# Patient Record
Sex: FEMALE | Race: WHITE | NOT HISPANIC OR LATINO
[De-identification: names, ages, dates, MRNs, and addresses within clinical notes are randomized per-mention and may not be internally consistent; named-entity substitution may affect disease eponyms.]

---

## 2024-07-16 ENCOUNTER — NON-APPOINTMENT (OUTPATIENT)
Age: 39
End: 2024-07-16

## 2024-07-18 ENCOUNTER — ASOB RESULT (OUTPATIENT)
Age: 39
End: 2024-07-18

## 2024-07-18 ENCOUNTER — APPOINTMENT (OUTPATIENT)
Dept: ANTEPARTUM | Facility: CLINIC | Age: 39
End: 2024-07-18
Payer: COMMERCIAL

## 2024-07-18 PROCEDURE — 76813 OB US NUCHAL MEAS 1 GEST: CPT

## 2024-07-18 PROCEDURE — 93976 VASCULAR STUDY: CPT

## 2024-09-05 ENCOUNTER — ASOB RESULT (OUTPATIENT)
Age: 39
End: 2024-09-05

## 2024-09-05 ENCOUNTER — APPOINTMENT (OUTPATIENT)
Dept: ANTEPARTUM | Facility: CLINIC | Age: 39
End: 2024-09-05
Payer: COMMERCIAL

## 2024-09-05 PROCEDURE — 76811 OB US DETAILED SNGL FETUS: CPT

## 2024-09-05 PROCEDURE — 76817 TRANSVAGINAL US OBSTETRIC: CPT

## 2024-11-04 ENCOUNTER — TRANSCRIPTION ENCOUNTER (OUTPATIENT)
Age: 39
End: 2024-11-04

## 2024-11-05 ENCOUNTER — ASOB RESULT (OUTPATIENT)
Age: 39
End: 2024-11-05

## 2024-11-05 ENCOUNTER — APPOINTMENT (OUTPATIENT)
Dept: ANTEPARTUM | Facility: CLINIC | Age: 39
End: 2024-11-05
Payer: COMMERCIAL

## 2024-11-05 PROCEDURE — 76816 OB US FOLLOW-UP PER FETUS: CPT

## 2024-11-05 PROCEDURE — 76819 FETAL BIOPHYS PROFIL W/O NST: CPT | Mod: 59

## 2024-11-05 PROCEDURE — 76820 UMBILICAL ARTERY ECHO: CPT | Mod: 59

## 2024-12-02 ENCOUNTER — ASOB RESULT (OUTPATIENT)
Age: 39
End: 2024-12-02

## 2024-12-02 ENCOUNTER — APPOINTMENT (OUTPATIENT)
Dept: ANTEPARTUM | Facility: CLINIC | Age: 39
End: 2024-12-02
Payer: COMMERCIAL

## 2024-12-02 PROCEDURE — 76816 OB US FOLLOW-UP PER FETUS: CPT

## 2024-12-02 PROCEDURE — 76820 UMBILICAL ARTERY ECHO: CPT | Mod: 59

## 2024-12-02 PROCEDURE — 76819 FETAL BIOPHYS PROFIL W/O NST: CPT | Mod: 59

## 2024-12-23 ENCOUNTER — APPOINTMENT (OUTPATIENT)
Dept: ANTEPARTUM | Facility: CLINIC | Age: 39
End: 2024-12-23
Payer: COMMERCIAL

## 2024-12-23 ENCOUNTER — ASOB RESULT (OUTPATIENT)
Age: 39
End: 2024-12-23

## 2024-12-23 PROCEDURE — 76819 FETAL BIOPHYS PROFIL W/O NST: CPT

## 2024-12-23 PROCEDURE — 76816 OB US FOLLOW-UP PER FETUS: CPT

## 2024-12-23 PROCEDURE — 76820 UMBILICAL ARTERY ECHO: CPT | Mod: 59

## 2025-01-06 ENCOUNTER — APPOINTMENT (OUTPATIENT)
Dept: ANTEPARTUM | Facility: CLINIC | Age: 40
End: 2025-01-06
Payer: COMMERCIAL

## 2025-01-06 ENCOUNTER — ASOB RESULT (OUTPATIENT)
Age: 40
End: 2025-01-06

## 2025-01-06 PROCEDURE — 76819 FETAL BIOPHYS PROFIL W/O NST: CPT

## 2025-01-06 PROCEDURE — 76820 UMBILICAL ARTERY ECHO: CPT | Mod: 59

## 2025-01-06 PROCEDURE — 76817 TRANSVAGINAL US OBSTETRIC: CPT

## 2025-01-06 PROCEDURE — 76816 OB US FOLLOW-UP PER FETUS: CPT

## 2025-01-14 PROBLEM — Z00.00 ENCOUNTER FOR PREVENTIVE HEALTH EXAMINATION: Status: ACTIVE | Noted: 2025-01-14

## 2025-01-16 ENCOUNTER — INPATIENT (INPATIENT)
Facility: HOSPITAL | Age: 40
LOS: 3 days | Discharge: HOME | End: 2025-01-20
Attending: STUDENT IN AN ORGANIZED HEALTH CARE EDUCATION/TRAINING PROGRAM | Admitting: STUDENT IN AN ORGANIZED HEALTH CARE EDUCATION/TRAINING PROGRAM
Payer: COMMERCIAL

## 2025-01-16 VITALS
SYSTOLIC BLOOD PRESSURE: 130 MMHG | RESPIRATION RATE: 18 BRPM | DIASTOLIC BLOOD PRESSURE: 80 MMHG | TEMPERATURE: 98 F | OXYGEN SATURATION: 100 % | HEART RATE: 95 BPM

## 2025-01-16 DIAGNOSIS — Z98.890 OTHER SPECIFIED POSTPROCEDURAL STATES: Chronic | ICD-10-CM

## 2025-01-16 LAB
BASOPHILS # BLD AUTO: 0.02 K/UL — SIGNIFICANT CHANGE UP (ref 0–0.2)
BASOPHILS NFR BLD AUTO: 0.2 % — SIGNIFICANT CHANGE UP (ref 0–2)
BLD GP AB SCN SERPL QL: NEGATIVE — SIGNIFICANT CHANGE UP
EOSINOPHIL # BLD AUTO: 0.05 K/UL — SIGNIFICANT CHANGE UP (ref 0–0.5)
EOSINOPHIL NFR BLD AUTO: 0.5 % — SIGNIFICANT CHANGE UP (ref 0–6)
HCT VFR BLD CALC: 31.8 % — LOW (ref 34.5–45)
HGB BLD-MCNC: 10.2 G/DL — LOW (ref 11.5–15.5)
IMM GRANULOCYTES NFR BLD AUTO: 0.9 % — SIGNIFICANT CHANGE UP (ref 0–0.9)
LYMPHOCYTES # BLD AUTO: 19.6 % — SIGNIFICANT CHANGE UP (ref 13–44)
LYMPHOCYTES # BLD AUTO: 2.1 K/UL — SIGNIFICANT CHANGE UP (ref 1–3.3)
MCHC RBC-ENTMCNC: 27.1 PG — SIGNIFICANT CHANGE UP (ref 27–34)
MCHC RBC-ENTMCNC: 32.1 G/DL — SIGNIFICANT CHANGE UP (ref 32–36)
MCV RBC AUTO: 84.4 FL — SIGNIFICANT CHANGE UP (ref 80–100)
MONOCYTES # BLD AUTO: 0.85 K/UL — SIGNIFICANT CHANGE UP (ref 0–0.9)
MONOCYTES NFR BLD AUTO: 7.9 % — SIGNIFICANT CHANGE UP (ref 2–14)
NEUTROPHILS # BLD AUTO: 7.62 K/UL — HIGH (ref 1.8–7.4)
NEUTROPHILS NFR BLD AUTO: 70.9 % — SIGNIFICANT CHANGE UP (ref 43–77)
NRBC # BLD: 0 /100 WBCS — SIGNIFICANT CHANGE UP (ref 0–0)
NRBC BLD-RTO: 0 /100 WBCS — SIGNIFICANT CHANGE UP (ref 0–0)
PLATELET # BLD AUTO: 256 K/UL — SIGNIFICANT CHANGE UP (ref 150–400)
RBC # BLD: 3.77 M/UL — LOW (ref 3.8–5.2)
RBC # FLD: 14.1 % — SIGNIFICANT CHANGE UP (ref 10.3–14.5)
RH IG SCN BLD-IMP: POSITIVE — SIGNIFICANT CHANGE UP
RH IG SCN BLD-IMP: POSITIVE — SIGNIFICANT CHANGE UP
T PALLIDUM AB TITR SER: NEGATIVE — SIGNIFICANT CHANGE UP
WBC # BLD: 10.74 K/UL — HIGH (ref 3.8–10.5)
WBC # FLD AUTO: 10.74 K/UL — HIGH (ref 3.8–10.5)

## 2025-01-16 RX ORDER — DEXAMETHASONE SODIUM PHOSPHATE 4 MG/ML
4 INJECTION, SOLUTION INTRA-ARTICULAR; INTRALESIONAL; INTRAMUSCULAR; INTRAVENOUS; SOFT TISSUE EVERY 6 HOURS
Refills: 0 | Status: DISCONTINUED | OUTPATIENT
Start: 2025-01-16 | End: 2025-01-17

## 2025-01-16 RX ORDER — OXYTOCIN/0.9 % SODIUM CHLORIDE 10/500ML
167 PLASTIC BAG, INJECTION (ML) INTRAVENOUS
Qty: 30 | Refills: 0 | Status: DISCONTINUED | OUTPATIENT
Start: 2025-01-16 | End: 2025-01-17

## 2025-01-16 RX ORDER — ONDANSETRON 4 MG/1
4 TABLET, ORALLY DISINTEGRATING ORAL ONCE
Refills: 0 | Status: COMPLETED | OUTPATIENT
Start: 2025-01-16 | End: 2025-01-16

## 2025-01-16 RX ORDER — ONDANSETRON 4 MG/1
4 TABLET, ORALLY DISINTEGRATING ORAL EVERY 6 HOURS
Refills: 0 | Status: DISCONTINUED | OUTPATIENT
Start: 2025-01-16 | End: 2025-01-17

## 2025-01-16 RX ORDER — SODIUM CHLORIDE 9 G/ML
1000 INJECTION, SOLUTION INTRAVENOUS
Refills: 0 | Status: DISCONTINUED | OUTPATIENT
Start: 2025-01-16 | End: 2025-01-17

## 2025-01-16 RX ORDER — FENTANYL/BUPIVACAINE/NS/PF 2MCG/ML-.1
250 PLASTIC BAG, INJECTION (ML) INJECTION
Refills: 0 | Status: DISCONTINUED | OUTPATIENT
Start: 2025-01-16 | End: 2025-01-17

## 2025-01-16 RX ORDER — LEVOTHYROXINE SODIUM 25 UG/1
150 TABLET ORAL DAILY
Refills: 0 | Status: DISCONTINUED | OUTPATIENT
Start: 2025-01-16 | End: 2025-01-17

## 2025-01-16 RX ORDER — FAMOTIDINE 10 MG/ML
20 INJECTION INTRAVENOUS ONCE
Refills: 0 | Status: COMPLETED | OUTPATIENT
Start: 2025-01-16 | End: 2025-01-16

## 2025-01-16 RX ORDER — OXYTOCIN/0.9 % SODIUM CHLORIDE 10/500ML
2 PLASTIC BAG, INJECTION (ML) INTRAVENOUS
Qty: 30 | Refills: 0 | Status: DISCONTINUED | OUTPATIENT
Start: 2025-01-16 | End: 2025-01-17

## 2025-01-16 RX ORDER — ANTISEPTIC SURGICAL SCRUB 0.04 MG/ML
1 SOLUTION TOPICAL DAILY
Refills: 0 | Status: DISCONTINUED | OUTPATIENT
Start: 2025-01-16 | End: 2025-01-17

## 2025-01-16 RX ORDER — SODIUM CITRATE AND CITRIC ACID MONOHYDRATE 1002; 1500 MG/15ML; MG/15ML
15 SOLUTION ORAL EVERY 6 HOURS
Refills: 0 | Status: DISCONTINUED | OUTPATIENT
Start: 2025-01-16 | End: 2025-01-17

## 2025-01-16 RX ORDER — NALOXONE HYDROCHLORIDE 3 MG/.1ML
0.1 SPRAY NASAL
Refills: 0 | Status: DISCONTINUED | OUTPATIENT
Start: 2025-01-16 | End: 2025-01-17

## 2025-01-16 RX ADMIN — ONDANSETRON 4 MILLIGRAM(S): 4 TABLET, ORALLY DISINTEGRATING ORAL at 13:50

## 2025-01-16 RX ADMIN — Medication 2 MILLIUNIT(S)/MIN: at 08:00

## 2025-01-16 RX ADMIN — FAMOTIDINE 20 MILLIGRAM(S): 10 INJECTION INTRAVENOUS at 19:40

## 2025-01-16 RX ADMIN — SODIUM CHLORIDE 125 MILLILITER(S): 9 INJECTION, SOLUTION INTRAVENOUS at 04:45

## 2025-01-16 RX ADMIN — SODIUM CHLORIDE 125 MILLILITER(S): 9 INJECTION, SOLUTION INTRAVENOUS at 14:00

## 2025-01-16 NOTE — OB PROVIDER LABOR PROGRESS NOTE - NS_OBIHICONTRACTIONPATTERNDETAILS_OBGYN_ALL_OB_FT
2-3 contractions in 10 minutes
q 2 min
q 3
Contractions q2-3min
ctx q 3-4 min
ctx q2-4 minutes
q2
contractions q3-6min
q 3
3 contractions in 10 minutes
Ctx irregularly on 16mU of pitocin

## 2025-01-16 NOTE — OB PROVIDER H&P - HISTORY OF PRESENT ILLNESS
39y  @ 39w0d who presents for rule out rupture of membranes. Patient states that at 0100, she felt a "pop" and then experienced a large gush of blood tinged fluid. Since this time, the patient endorses that she continues to leak. Additionally, patient states that she began experiencing contractions around 2130 on 1/15. Patient states that since this time, especially after SROM, her contractions have become more intense and frequent. She reports that her contractions are now 3-4min apart. Patient endorses fetal movement. Denies vaginal bleeding.    Ante: spontaneous conception, nipt low risk, anatomy sono wnl, gct passed, gbs neg, EFW 4000g by leopolds. Last BH scan on 25 EFW 3748g (94%ile), AC 99%ile. Uncomplicated pregnancy. s/p ASA 81mg for PEC ppx    OBhx:   G1- current  GYNhx: Denies any current uterine fibroids, ovarian cysts, abnormal paps, or STIs/herpes  Shx: , hysteroscopic fibroidectomy. 2018 total thyroidectomy  Medhx: Hx of thyroid cancer s/p thyroidectomy. Hypothyroidism. Denies hx of asthma, thyroid disorders, or hypertensive disorders  Meds: PNV, Synthroid 150mcg, Zofran, vitamin D  Allergies: No Known Allergies        PE:  HR: 95  BP: 130/80  RR: 18  SpO2: 100  General: AOx3. No acute distress, breathing through contractions.  Respiratory: No increased work of breathing, no signs of respiratory distress  Abdominal: Gravid, soft, nontender  Extremities: Atraumatic in appearance. No erythema, no unilateral swelling   SVE: 5/80/-2, forebag ruptured LM upon exam   SSE: nitrazine equivocal    NST: 130bpm, moderate variability, +accels, -decels  TOCO: Ctx q3-4 min  TAUS: Cephalic presentation, posterior placenta (see image attached)     39y  @ 39w0d who presents for rule out rupture of membranes. Patient states that at 0100, she felt a "pop" and then experienced a large gush of blood tinged fluid. Since this time, the patient endorses that she continues to leak. Additionally, patient states that she began experiencing contractions around 2130 on 1/15. Patient states that since this time, especially after SROM, her contractions have become more intense and frequent. She reports that her contractions are now 3-4min apart. Patient endorses fetal movement. Denies vaginal bleeding.    Ante: spontaneous conception, nipt low risk, anatomy sono wnl, gct passed, gbs neg, EFW 4000g by leopolds. Last BH scan on 25 EFW 3748g (94%ile), AC 99%ile. Uncomplicated pregnancy. s/p ASA 81mg for PEC ppx    OBhx:   G1- current  GYNhx: Endorses remote hx of abnormal pap. Denies any current uterine fibroids, ovarian cysts, abnormal paps, or STIs/herpes  Shx: , hysteroscopic fibroidectomy. 2018 total thyroidectomy  Medhx: Hx of thyroid cancer s/p thyroidectomy. Hypothyroidism. Denies hx of asthma, thyroid disorders, or hypertensive disorders  Meds: PNV, Synthroid 150mcg, Zofran, vitamin D  Allergies: No Known Allergies        PE:  HR: 95  BP: 130/80  RR: 18  SpO2: 100  General: AOx3. No acute distress, breathing through contractions.  Respiratory: No increased work of breathing, no signs of respiratory distress  Abdominal: Gravid, soft, nontender  Extremities: Atraumatic in appearance. No erythema, no unilateral swelling   SVE: 5/80/-2, forebag ruptured LM upon exam   SSE: nitrazine equivocal    NST: 130bpm, moderate variability, +accels, -decels  TOCO: Ctx q3-4 min  TAUS: Cephalic presentation, posterior placenta (see image attached)

## 2025-01-16 NOTE — OB PROVIDER H&P - NSLOWPPHRISK_OBGYN_A_OB
No previous uterine incision/[place] Pregnancy/Less than or equal to 4 previous vaginal births/No known bleeding disorder/No history of postpartum hemorrhage/No other PPH risks indicated

## 2025-01-16 NOTE — PRE-ANESTHESIA EVALUATION ADULT - NSANTHOSAYNRD_GEN_A_CORE
No. LIYAH screening performed.  STOP BANG Legend: 0-2 = LOW Risk; 3-4 = INTERMEDIATE Risk; 5-8 = HIGH Risk

## 2025-01-16 NOTE — OB PROVIDER LABOR PROGRESS NOTE - ASSESSMENT
Plan to start pitocin. 
Tracing overall reassuring, tracing improved with repositioning  Will continue to monitor
-s/p SROM BT --> LM  -Epidural in situ  -Continue to monitor and titrate pitocin as tolerated    Stephania Haas PA-C
dw patient possible arrest  recommend an IUPC but patient wants to wait before further intervention  dw pt re possible cpd
Para 0 at 39 weeks in labor  Patient comfortable with epidural  augmentation of labor with pitocin  continuous fetal monitoring
- Continue current management    Dr. Mustafa in house.
38 yo  @ 39w0d in early labor  plan for epidural  will re-examine after epidural
Para 0 at 39 wks 1 day  Patient repositioned  continue to augment labor with pitocin  suspected LGA- shoulder precautions
Pt presenting in early labor, suspected macrosomia  tracing overall reassuring  discussed pitocin if ctx spaced  ve unchanged

## 2025-01-16 NOTE — OB RN PATIENT PROFILE - NS PRO RUBELLA RECEIVED Y/N
Patient's Nurse  with [facility] needs a copy of the patient's current medication list     Pls fax to:  Kalli [facility] Nurse Case Chris  FAX: [phone]  [facility]: [phone]
Printed and faxed 
no...

## 2025-01-16 NOTE — OB PROVIDER LABOR PROGRESS NOTE - NS_OBIHIFHRDETAILS_OBGYN_ALL_OB_FT
120 bpm, moderate variability, - accels, + late decel
Cat II: 145bpm, moderate variability, +accels, +variable decels
FHT Category I with moderate variability and accelerations, no decelerations.  Period of non-recurrent late decelerations earlier that resolved with maternal position change.
fhr reassuring  good ltv, negative decels
fhr reassuring with good ltv  neg decels
FHR 120s with moderate variability, positive accelerations, no decelerations  Category I tracing
baseline , mod variability, +accels, -decels, cat I
fhr reassuring. occasional type 1 decels
130bpm, moderate variability, + accels, - decels
FHR 140s with moderate variability, positive accelerations, intermittent variable decelerations  Category II tracing
baseline 150, mod schuyler, -accels, +2 decelerations; Cat II tracing

## 2025-01-16 NOTE — OB PROVIDER H&P - ASSESSMENT
39y  @ 39w0d who is admitted for SROM/early labor  - Admit to L&D  - NPO/IV Hydration  - Continuous EFM and toco  - Routine labs ordred  - Prenatal labs reviewed, GBS (-)  - Plan for expectant management   - Consents obtained for vaginal delivery and all indicated procedures    Discussed with Dr. Estevan Haas PA-C 39y  @ 39w0d who is admitted for SROM/early labor  - Admit to L&D  - NPO/IV Hydration  - Continuous EFM and toco  - Routine labs ordred  - Prenatal labs reviewed, GBS (-)  - Shoulder precautions. r/b discussed  - Plan for expectant management   - Consents obtained for vaginal delivery and all indicated procedures    Discussed with Dr. Estevan Haas PA-C

## 2025-01-16 NOTE — OB PROVIDER H&P - NSOBVTERISKASSESS_OBGYN_ALL_OB_FT
OBAntePartum Assessment Completed on: 16-Jan-2025 03:19 OBAntePartum Assessment Completed on: 16-Jan-2025 03:26

## 2025-01-17 ENCOUNTER — TRANSCRIPTION ENCOUNTER (OUTPATIENT)
Age: 40
End: 2025-01-17

## 2025-01-17 PROCEDURE — 88307 TISSUE EXAM BY PATHOLOGIST: CPT | Mod: 26

## 2025-01-17 RX ORDER — ACETAMINOPHEN 160 MG/5ML
975 SUSPENSION ORAL
Refills: 0 | Status: DISCONTINUED | OUTPATIENT
Start: 2025-01-17 | End: 2025-01-20

## 2025-01-17 RX ORDER — OXYCODONE HYDROCHLORIDE 30 MG/1
5 TABLET ORAL ONCE
Refills: 0 | Status: DISCONTINUED | OUTPATIENT
Start: 2025-01-17 | End: 2025-01-20

## 2025-01-17 RX ORDER — DIPHENOXYLATE HYDROCHLORIDE AND ATROPINE SULFATE 2.5; .025 MG/1; MG/1
1 TABLET ORAL ONCE
Refills: 0 | Status: DISCONTINUED | OUTPATIENT
Start: 2025-01-17 | End: 2025-01-17

## 2025-01-17 RX ORDER — METHYLERGONOVINE MALEATE 0.2 MG/1
0.2 TABLET, COATED ORAL EVERY 4 HOURS
Refills: 0 | Status: COMPLETED | OUTPATIENT
Start: 2025-01-17 | End: 2025-01-17

## 2025-01-17 RX ORDER — SODIUM CHLORIDE 9 G/ML
1000 INJECTION, SOLUTION INTRAVENOUS
Refills: 0 | Status: DISCONTINUED | OUTPATIENT
Start: 2025-01-17 | End: 2025-01-20

## 2025-01-17 RX ORDER — MAGNESIUM HYDROXIDE 400 MG/5ML
30 SUSPENSION, ORAL (FINAL DOSE FORM) ORAL
Refills: 0 | Status: DISCONTINUED | OUTPATIENT
Start: 2025-01-17 | End: 2025-01-20

## 2025-01-17 RX ORDER — METOCLOPRAMIDE 10 MG/1
10 TABLET ORAL ONCE
Refills: 0 | Status: COMPLETED | OUTPATIENT
Start: 2025-01-17 | End: 2025-01-17

## 2025-01-17 RX ORDER — DIPHENHYDRAMINE HCL 25 MG
25 CAPSULE ORAL EVERY 6 HOURS
Refills: 0 | Status: DISCONTINUED | OUTPATIENT
Start: 2025-01-17 | End: 2025-01-20

## 2025-01-17 RX ORDER — SODIUM CITRATE AND CITRIC ACID MONOHYDRATE 1002; 1500 MG/15ML; MG/15ML
30 SOLUTION ORAL ONCE
Refills: 0 | Status: COMPLETED | OUTPATIENT
Start: 2025-01-17 | End: 2025-01-17

## 2025-01-17 RX ORDER — ONDANSETRON 4 MG/1
8 TABLET, ORALLY DISINTEGRATING ORAL ONCE
Refills: 0 | Status: COMPLETED | OUTPATIENT
Start: 2025-01-17 | End: 2025-01-17

## 2025-01-17 RX ORDER — OXYTOCIN/0.9 % SODIUM CHLORIDE 10/500ML
42 PLASTIC BAG, INJECTION (ML) INTRAVENOUS
Qty: 30 | Refills: 0 | Status: DISCONTINUED | OUTPATIENT
Start: 2025-01-17 | End: 2025-01-20

## 2025-01-17 RX ORDER — FAMOTIDINE 10 MG/ML
20 INJECTION INTRAVENOUS ONCE
Refills: 0 | Status: COMPLETED | OUTPATIENT
Start: 2025-01-17 | End: 2025-01-17

## 2025-01-17 RX ORDER — ENOXAPARIN SODIUM 100 MG/ML
40 INJECTION SUBCUTANEOUS EVERY 24 HOURS
Refills: 0 | Status: DISCONTINUED | OUTPATIENT
Start: 2025-01-17 | End: 2025-01-20

## 2025-01-17 RX ORDER — SCOPOLAMINE 1 MG/3D
1 PATCH, EXTENDED RELEASE TRANSDERMAL ONCE
Refills: 0 | Status: COMPLETED | OUTPATIENT
Start: 2025-01-17 | End: 2025-01-17

## 2025-01-17 RX ORDER — CEFAZOLIN SODIUM IN 0.9 % NACL 2 G/10 ML
2000 SYRINGE (ML) INTRAVENOUS ONCE
Refills: 0 | Status: COMPLETED | OUTPATIENT
Start: 2025-01-17 | End: 2025-01-17

## 2025-01-17 RX ORDER — AZITHROMYCIN DIHYDRATE 500 MG/1
500 TABLET, FILM COATED ORAL ONCE
Refills: 0 | Status: COMPLETED | OUTPATIENT
Start: 2025-01-17 | End: 2025-01-17

## 2025-01-17 RX ORDER — OXYCODONE HYDROCHLORIDE 30 MG/1
5 TABLET ORAL
Refills: 0 | Status: DISCONTINUED | OUTPATIENT
Start: 2025-01-17 | End: 2025-01-20

## 2025-01-17 RX ORDER — IBUPROFEN 600 MG/1
600 TABLET, FILM COATED ORAL EVERY 6 HOURS
Refills: 0 | Status: COMPLETED | OUTPATIENT
Start: 2025-01-17 | End: 2025-12-16

## 2025-01-17 RX ORDER — CLOSTRIDIUM TETANI TOXOID ANTIGEN (FORMALDEHYDE INACTIVATED), CORYNEBACTERIUM DIPHTHERIAE TOXOID ANTIGEN (FORMALDEHYDE INACTIVATED), BORDETELLA PERTUSSIS TOXOID ANTIGEN (GLUTARALDEHYDE INACTIVATED), BORDETELLA PERTUSSIS FILAMENTOUS HEMAGGLUTININ ANTIGEN (FORMALDEHYDE INACTIVATED), BORDETELLA PERTUSSIS PERTACTIN ANTIGEN, AND BORDETELLA PERTUSSIS FIMBRIAE 2/3 ANTIGEN 5; 2; 2.5; 5; 3; 5 [LF]/.5ML; [LF]/.5ML; UG/.5ML; UG/.5ML; UG/.5ML; UG/.5ML
0.5 INJECTION, SUSPENSION INTRAMUSCULAR ONCE
Refills: 0 | Status: COMPLETED | OUTPATIENT
Start: 2025-01-17

## 2025-01-17 RX ORDER — KETOROLAC TROMETHAMINE 10 MG
30 TABLET ORAL EVERY 6 HOURS
Refills: 0 | Status: DISCONTINUED | OUTPATIENT
Start: 2025-01-17 | End: 2025-01-17

## 2025-01-17 RX ORDER — LOPERAMIDE HYDROCHLORIDE 2 MG/1
2 CAPSULE ORAL ONCE
Refills: 0 | Status: COMPLETED | OUTPATIENT
Start: 2025-01-17 | End: 2025-01-17

## 2025-01-17 RX ORDER — ANTISEPTIC SURGICAL SCRUB 0.04 MG/ML
1 SOLUTION TOPICAL DAILY
Refills: 0 | Status: DISCONTINUED | OUTPATIENT
Start: 2025-01-17 | End: 2025-01-17

## 2025-01-17 RX ADMIN — Medication 30 MILLIGRAM(S): at 03:59

## 2025-01-17 RX ADMIN — ONDANSETRON 8 MILLIGRAM(S): 4 TABLET, ORALLY DISINTEGRATING ORAL at 06:36

## 2025-01-17 RX ADMIN — Medication 30 MILLIGRAM(S): at 16:40

## 2025-01-17 RX ADMIN — Medication 30 MILLIGRAM(S): at 10:50

## 2025-01-17 RX ADMIN — Medication 42 MILLIUNIT(S)/MIN: at 02:51

## 2025-01-17 RX ADMIN — SODIUM CHLORIDE 125 MILLILITER(S): 9 INJECTION, SOLUTION INTRAVENOUS at 08:55

## 2025-01-17 RX ADMIN — METOCLOPRAMIDE 10 MILLIGRAM(S): 10 TABLET ORAL at 08:47

## 2025-01-17 RX ADMIN — SODIUM CITRATE AND CITRIC ACID MONOHYDRATE 30 MILLILITER(S): 1002; 1500 SOLUTION ORAL at 01:01

## 2025-01-17 RX ADMIN — SCOPOLAMINE 1 PATCH: 1 PATCH, EXTENDED RELEASE TRANSDERMAL at 14:34

## 2025-01-17 RX ADMIN — Medication 30 MILLIGRAM(S): at 21:00

## 2025-01-17 RX ADMIN — LOPERAMIDE HYDROCHLORIDE 2 MILLIGRAM(S): 2 CAPSULE ORAL at 02:24

## 2025-01-17 RX ADMIN — Medication 100 MILLIGRAM(S): at 00:35

## 2025-01-17 RX ADMIN — AZITHROMYCIN DIHYDRATE 255 MILLIGRAM(S): 500 TABLET, FILM COATED ORAL at 01:00

## 2025-01-17 RX ADMIN — METHYLERGONOVINE MALEATE 0.2 MILLIGRAM(S): 0.2 TABLET, COATED ORAL at 06:07

## 2025-01-17 RX ADMIN — METHYLERGONOVINE MALEATE 0.2 MILLIGRAM(S): 0.2 TABLET, COATED ORAL at 09:57

## 2025-01-17 RX ADMIN — SCOPOLAMINE 1 PATCH: 1 PATCH, EXTENDED RELEASE TRANSDERMAL at 19:33

## 2025-01-17 RX ADMIN — SODIUM CHLORIDE 125 MILLILITER(S): 9 INJECTION, SOLUTION INTRAVENOUS at 16:25

## 2025-01-17 RX ADMIN — Medication 30 MILLIGRAM(S): at 09:57

## 2025-01-17 RX ADMIN — Medication 30 MILLIGRAM(S): at 15:56

## 2025-01-17 RX ADMIN — ENOXAPARIN SODIUM 40 MILLIGRAM(S): 100 INJECTION SUBCUTANEOUS at 15:56

## 2025-01-17 RX ADMIN — FAMOTIDINE 20 MILLIGRAM(S): 10 INJECTION INTRAVENOUS at 01:01

## 2025-01-17 NOTE — LACTATION INITIAL EVALUATION - LACTATION INTERVENTIONS
initiate/review safe skin-to-skin/initiate/review hand expression/post discharge community resources provided/review techniques to increase milk supply/review techniques to manage sore nipples/engorgement/reviewed components of an effective feeding and at least 8 effective feedings per day required/reviewed importance of monitoring infant diapers, the breastfeeding log, and minimum output each day/reviewed risks of unnecessary formula supplementation/reviewed risks of artificial nipples/reviewed feeding on demand/by cue at least 8 times a day/reviewed indications of inadequate milk transfer that would require supplementation

## 2025-01-17 NOTE — OB PROVIDER DELIVERY SUMMARY - NSPROVIDERDELIVERYNOTE_OBGYN_ALL_OB_FT
Patient 40yo  at 39+1 presented for spontaneous rupture of membranes on 25 at 1:00am. Patient became fully dilated and pushed without descent of fetal head. Low transverse  section performed for arrest of descent. Fetal pillow placed. Delivery of live male infant in cephalic presentation. Uterus closed in single layer with Chromic suture. Seprafilm placed over uterus. Lower uterine segment atony noted. Methergine 0.2mg IM administered. Hemabate 0.25mg administered IM. Improvement in uterine tone. Muscle closed. Fascia closed with 1-0 Vicryl suture. SubQ closed with 3-0 Monocryl suture. Skin closed with 3-0 Monocryl suture. EBL 900cc, IVF 2000cc, UOP 100cc. Patient tolerated procedure well.

## 2025-01-17 NOTE — LACTATION INITIAL EVALUATION - NS LACT CON REASON FOR REQ
Baby has been supplemented with formula due to maternal nausea and discomfort post delivery. Supply and demand feedback system for milk production reviewed, and importance of pumping and/or performing hand expression for any missed or ineffective feeds in the future was discussed. Mother states infant has latched and nurse well today. Follow up prn./primaparous mom/staff request

## 2025-01-17 NOTE — OB RN INTRAOPERATIVE NOTE - NSSELHIDDEN_OBGYN_ALL_OB_FT
[NS_DeliveryAttending1_OBGYN_ALL_OB_FT:[id number]],[NS_DeliveryAssist1_OBGYN_ALL_OB_FT:[id number]],[NS_DeliveryRN_OBGYN_ALL_OB_FT:[id number]]

## 2025-01-17 NOTE — OB RN DELIVERY SUMMARY - NSSELHIDDEN_OBGYN_ALL_OB_FT
[NS_DeliveryAttending1_OBGYN_ALL_OB_FT:[id number]],[NS_DeliveryAssist1_OBGYN_ALL_OB_FT:[id number]],[NS_DeliveryRN_OBGYN_ALL_OB_FT:[id number]=]

## 2025-01-17 NOTE — OB PROVIDER LABOR PROGRESS NOTE - NS_SUBJECTIVE/OBJECTIVE_OBGYN_ALL_OB_FT
135 mod schuyler -accel -decel  ctx q3-4 min   Cat I reassuring fetal acid base status  continue expectant management
EFM reviewed.  Baseline rate is 145 bpm, mod schuyler, + accels, + intermittent late decels  Ctx q3-4 mins  Cat II tracing; overall reassuring with moderate variability and + accels  Dr. Hilliard aware
EFM reviewed. Pushing paused.  Baseline rate is 150 bpm, mod schuyler, + accels, + intermittent variable and late decels  Ctx q3 mins  Cat II tracing; overall reassuring with moderate variability and + accels  Will consider c section vs. continued pushing
pt complains of left hip pain with contractions  will get patient a top off from anesthesia
Pt reporting increasing intensity of contractions. Requesting epidural.
Night team assuming care at this time. EFM and labor plan reviewed. Last VE: 7.5/80/-1
Patient overall comfortable with epidural.  Pitocin at 6mu.
pt feels very tired  states that she is unable to push any more  wants to go to   dw pt re  section  pt wants to proceed
pt s/p top off
Patient seen at the bedside for deceleration x1.5 minutes. Patient was just repositions with return to baseline  Pitocin on at 12mu  Epidural in situ
pt comfortable post top off
pt feels comfortable
pt very numb and unable to push. will ask anesthsia about turning down epidural
Pt feeling comfortable now post epidural

## 2025-01-17 NOTE — OB RN DELIVERY SUMMARY - NS_SEPSISRSKCALC_OBGYN_ALL_OB_FT
Rupture of membranes must be entered above.   EOS calculated successfully. EOS Risk Factor: 0.5/1000 live births ([facility] national incidence); GA=39w1d; Temp=99.1; ROM=23.85; GBS='Negative'; Antibiotics='No antibiotics or any antibiotics < 2 hrs prior to birth'

## 2025-01-17 NOTE — OB NEONATOLOGY/PEDIATRICIAN DELIVERY SUMMARY - BABY A: APGAR 5 MIN RESP RATE, DELIVERY
(2) good, crying Cheiloplasty (Less Than 50%) Text: A decision was made to reconstruct the defect with a  cheiloplasty.  The defect was undermined extensively.  Additional obicularis oris muscle was excised with a 15c blade scalpel.  The defect was converted into a full thickness wedge, of less than 50% of the vertical height of the lip, to facilite a better cosmetic result.  Small vessels were then tied off with 5-0 monocyrl. The obicularis oris, superficial fascia, adipose and dermis were then reapproximated.  After the deeper layers were approximated the epidermis was reapproximated with particular care given to realign the vermilion border.

## 2025-01-17 NOTE — OB PROVIDER DELIVERY SUMMARY - NSSELHIDDEN_OBGYN_ALL_OB_FT
[NS_DeliveryAttending1_OBGYN_ALL_OB_FT:[id number]],[NS_DeliveryAssist1_OBGYN_ALL_OB_FT:[id number]],[NS_DeliveryRN_OBGYN_ALL_OB_FT:[id number]=]

## 2025-01-17 NOTE — OB PROVIDER LABOR PROGRESS NOTE - NSVAGINALEXAM_OBGYN_ALL_OB_DT
16-Jan-2025 22:00
16-Jan-2025 13:30
17-Jan-2025 00:17
16-Jan-2025 12:35
16-Jan-2025 17:54
16-Jan-2025 06:13

## 2025-01-17 NOTE — OB NEONATOLOGY/PEDIATRICIAN DELIVERY SUMMARY - NSPEDSNEONOTESA_OBGYN_ALL_OB_FT
Called to OR for delivery due to arrest of descent and cat II tracing of a term infant. Infant born vigorous , no apparent deformity. Admitted to  nursery

## 2025-01-18 LAB
BASOPHILS # BLD AUTO: 0.04 K/UL — SIGNIFICANT CHANGE UP (ref 0–0.2)
BASOPHILS NFR BLD AUTO: 0.3 % — SIGNIFICANT CHANGE UP (ref 0–2)
EOSINOPHIL # BLD AUTO: 0.08 K/UL — SIGNIFICANT CHANGE UP (ref 0–0.5)
EOSINOPHIL NFR BLD AUTO: 0.5 % — SIGNIFICANT CHANGE UP (ref 0–6)
HCT VFR BLD CALC: 27.8 % — LOW (ref 34.5–45)
HGB BLD-MCNC: 8.7 G/DL — LOW (ref 11.5–15.5)
IMM GRANULOCYTES NFR BLD AUTO: 0.8 % — SIGNIFICANT CHANGE UP (ref 0–0.9)
LYMPHOCYTES # BLD AUTO: 14.2 % — SIGNIFICANT CHANGE UP (ref 13–44)
LYMPHOCYTES # BLD AUTO: 2.08 K/UL — SIGNIFICANT CHANGE UP (ref 1–3.3)
MCHC RBC-ENTMCNC: 27.4 PG — SIGNIFICANT CHANGE UP (ref 27–34)
MCHC RBC-ENTMCNC: 31.3 G/DL — LOW (ref 32–36)
MCV RBC AUTO: 87.4 FL — SIGNIFICANT CHANGE UP (ref 80–100)
MONOCYTES # BLD AUTO: 0.75 K/UL — SIGNIFICANT CHANGE UP (ref 0–0.9)
MONOCYTES NFR BLD AUTO: 5.1 % — SIGNIFICANT CHANGE UP (ref 2–14)
NEUTROPHILS # BLD AUTO: 11.54 K/UL — HIGH (ref 1.8–7.4)
NEUTROPHILS NFR BLD AUTO: 79.1 % — HIGH (ref 43–77)
NRBC # BLD: 0 /100 WBCS — SIGNIFICANT CHANGE UP (ref 0–0)
NRBC BLD-RTO: 0 /100 WBCS — SIGNIFICANT CHANGE UP (ref 0–0)
PLATELET # BLD AUTO: 213 K/UL — SIGNIFICANT CHANGE UP (ref 150–400)
RBC # BLD: 3.18 M/UL — LOW (ref 3.8–5.2)
RBC # FLD: 14.9 % — HIGH (ref 10.3–14.5)
WBC # BLD: 14.61 K/UL — HIGH (ref 3.8–10.5)
WBC # FLD AUTO: 14.61 K/UL — HIGH (ref 3.8–10.5)

## 2025-01-18 RX ORDER — IBUPROFEN 600 MG/1
600 TABLET, FILM COATED ORAL EVERY 6 HOURS
Refills: 0 | Status: DISCONTINUED | OUTPATIENT
Start: 2025-01-18 | End: 2025-01-20

## 2025-01-18 RX ORDER — LEVOTHYROXINE SODIUM 25 UG/1
150 TABLET ORAL DAILY
Refills: 0 | Status: DISCONTINUED | OUTPATIENT
Start: 2025-01-18 | End: 2025-01-20

## 2025-01-18 RX ADMIN — IBUPROFEN 600 MILLIGRAM(S): 600 TABLET, FILM COATED ORAL at 08:37

## 2025-01-18 RX ADMIN — SCOPOLAMINE 1 PATCH: 1 PATCH, EXTENDED RELEASE TRANSDERMAL at 18:45

## 2025-01-18 RX ADMIN — IBUPROFEN 600 MILLIGRAM(S): 600 TABLET, FILM COATED ORAL at 02:00

## 2025-01-18 RX ADMIN — IBUPROFEN 600 MILLIGRAM(S): 600 TABLET, FILM COATED ORAL at 20:58

## 2025-01-18 RX ADMIN — IBUPROFEN 600 MILLIGRAM(S): 600 TABLET, FILM COATED ORAL at 21:55

## 2025-01-18 RX ADMIN — ACETAMINOPHEN 975 MILLIGRAM(S): 160 SUSPENSION ORAL at 23:55

## 2025-01-18 RX ADMIN — ACETAMINOPHEN 975 MILLIGRAM(S): 160 SUSPENSION ORAL at 18:52

## 2025-01-18 RX ADMIN — SCOPOLAMINE 1 PATCH: 1 PATCH, EXTENDED RELEASE TRANSDERMAL at 07:57

## 2025-01-18 RX ADMIN — ACETAMINOPHEN 975 MILLIGRAM(S): 160 SUSPENSION ORAL at 12:29

## 2025-01-18 RX ADMIN — IBUPROFEN 600 MILLIGRAM(S): 600 TABLET, FILM COATED ORAL at 14:48

## 2025-01-18 RX ADMIN — ENOXAPARIN SODIUM 40 MILLIGRAM(S): 100 INJECTION SUBCUTANEOUS at 14:49

## 2025-01-18 RX ADMIN — Medication 30 MILLILITER(S): at 08:58

## 2025-01-18 RX ADMIN — Medication 80 MILLIGRAM(S): at 14:48

## 2025-01-19 RX ADMIN — Medication 80 MILLIGRAM(S): at 08:36

## 2025-01-19 RX ADMIN — IBUPROFEN 600 MILLIGRAM(S): 600 TABLET, FILM COATED ORAL at 08:36

## 2025-01-19 RX ADMIN — Medication 1 APPLICATION(S): at 09:09

## 2025-01-19 RX ADMIN — IBUPROFEN 600 MILLIGRAM(S): 600 TABLET, FILM COATED ORAL at 20:42

## 2025-01-19 RX ADMIN — IBUPROFEN 600 MILLIGRAM(S): 600 TABLET, FILM COATED ORAL at 02:46

## 2025-01-19 RX ADMIN — ACETAMINOPHEN 975 MILLIGRAM(S): 160 SUSPENSION ORAL at 18:09

## 2025-01-19 RX ADMIN — LEVOTHYROXINE SODIUM 150 MICROGRAM(S): 25 TABLET ORAL at 05:58

## 2025-01-19 RX ADMIN — SCOPOLAMINE 1 PATCH: 1 PATCH, EXTENDED RELEASE TRANSDERMAL at 18:45

## 2025-01-19 RX ADMIN — ENOXAPARIN SODIUM 40 MILLIGRAM(S): 100 INJECTION SUBCUTANEOUS at 14:54

## 2025-01-19 RX ADMIN — ACETAMINOPHEN 975 MILLIGRAM(S): 160 SUSPENSION ORAL at 00:50

## 2025-01-19 RX ADMIN — IBUPROFEN 600 MILLIGRAM(S): 600 TABLET, FILM COATED ORAL at 03:40

## 2025-01-19 RX ADMIN — Medication 80 MILLIGRAM(S): at 12:36

## 2025-01-19 RX ADMIN — IBUPROFEN 600 MILLIGRAM(S): 600 TABLET, FILM COATED ORAL at 21:40

## 2025-01-19 RX ADMIN — IBUPROFEN 600 MILLIGRAM(S): 600 TABLET, FILM COATED ORAL at 14:55

## 2025-01-19 RX ADMIN — SCOPOLAMINE 1 PATCH: 1 PATCH, EXTENDED RELEASE TRANSDERMAL at 20:00

## 2025-01-19 RX ADMIN — ACETAMINOPHEN 975 MILLIGRAM(S): 160 SUSPENSION ORAL at 05:58

## 2025-01-19 RX ADMIN — Medication 30 MILLILITER(S): at 09:09

## 2025-01-19 RX ADMIN — ACETAMINOPHEN 975 MILLIGRAM(S): 160 SUSPENSION ORAL at 06:55

## 2025-01-19 RX ADMIN — ACETAMINOPHEN 975 MILLIGRAM(S): 160 SUSPENSION ORAL at 12:36

## 2025-01-19 RX ADMIN — SCOPOLAMINE 1 PATCH: 1 PATCH, EXTENDED RELEASE TRANSDERMAL at 06:14

## 2025-01-20 ENCOUNTER — TRANSCRIPTION ENCOUNTER (OUTPATIENT)
Age: 40
End: 2025-01-20

## 2025-01-20 VITALS
SYSTOLIC BLOOD PRESSURE: 118 MMHG | TEMPERATURE: 98 F | OXYGEN SATURATION: 95 % | RESPIRATION RATE: 18 BRPM | HEART RATE: 57 BPM | DIASTOLIC BLOOD PRESSURE: 71 MMHG

## 2025-01-20 PROCEDURE — 86901 BLOOD TYPING SEROLOGIC RH(D): CPT

## 2025-01-20 PROCEDURE — 88307 TISSUE EXAM BY PATHOLOGIST: CPT

## 2025-01-20 PROCEDURE — 59050 FETAL MONITOR W/REPORT: CPT

## 2025-01-20 PROCEDURE — 86900 BLOOD TYPING SEROLOGIC ABO: CPT

## 2025-01-20 PROCEDURE — 85025 COMPLETE CBC W/AUTO DIFF WBC: CPT

## 2025-01-20 PROCEDURE — 86780 TREPONEMA PALLIDUM: CPT

## 2025-01-20 PROCEDURE — 36415 COLL VENOUS BLD VENIPUNCTURE: CPT

## 2025-01-20 PROCEDURE — 86850 RBC ANTIBODY SCREEN: CPT

## 2025-01-20 PROCEDURE — 90715 TDAP VACCINE 7 YRS/> IM: CPT

## 2025-01-20 RX ORDER — CLOSTRIDIUM TETANI TOXOID ANTIGEN (FORMALDEHYDE INACTIVATED), CORYNEBACTERIUM DIPHTHERIAE TOXOID ANTIGEN (FORMALDEHYDE INACTIVATED), BORDETELLA PERTUSSIS TOXOID ANTIGEN (GLUTARALDEHYDE INACTIVATED), BORDETELLA PERTUSSIS FILAMENTOUS HEMAGGLUTININ ANTIGEN (FORMALDEHYDE INACTIVATED), BORDETELLA PERTUSSIS PERTACTIN ANTIGEN, AND BORDETELLA PERTUSSIS FIMBRIAE 2/3 ANTIGEN 5; 2; 2.5; 5; 3; 5 [LF]/.5ML; [LF]/.5ML; UG/.5ML; UG/.5ML; UG/.5ML; UG/.5ML
0.5 INJECTION, SUSPENSION INTRAMUSCULAR ONCE
Refills: 0 | Status: COMPLETED | OUTPATIENT
Start: 2025-01-20 | End: 2025-01-20

## 2025-01-20 RX ORDER — IBUPROFEN 600 MG/1
1 TABLET, FILM COATED ORAL
Qty: 0 | Refills: 0 | DISCHARGE
Start: 2025-01-20

## 2025-01-20 RX ORDER — POLYETHYLENE GLYCOL 3350 17 G/17G
17 POWDER, FOR SOLUTION ORAL EVERY 24 HOURS
Refills: 0 | Status: DISCONTINUED | OUTPATIENT
Start: 2025-01-20 | End: 2025-01-20

## 2025-01-20 RX ORDER — ACETAMINOPHEN 160 MG/5ML
3 SUSPENSION ORAL
Qty: 0 | Refills: 0 | DISCHARGE
Start: 2025-01-20

## 2025-01-20 RX ADMIN — ACETAMINOPHEN 975 MILLIGRAM(S): 160 SUSPENSION ORAL at 01:00

## 2025-01-20 RX ADMIN — CLOSTRIDIUM TETANI TOXOID ANTIGEN (FORMALDEHYDE INACTIVATED), CORYNEBACTERIUM DIPHTHERIAE TOXOID ANTIGEN (FORMALDEHYDE INACTIVATED), BORDETELLA PERTUSSIS TOXOID ANTIGEN (GLUTARALDEHYDE INACTIVATED), BORDETELLA PERTUSSIS FILAMENTOUS HEMAGGLUTININ ANTIGEN (FORMALDEHYDE INACTIVATED), BORDETELLA PERTUSSIS PERTACTIN ANTIGEN, AND BORDETELLA PERTUSSIS FIMBRIAE 2/3 ANTIGEN 0.5 MILLILITER(S): 5; 2; 2.5; 5; 3; 5 INJECTION, SUSPENSION INTRAMUSCULAR at 11:47

## 2025-01-20 RX ADMIN — ACETAMINOPHEN 975 MILLIGRAM(S): 160 SUSPENSION ORAL at 07:00

## 2025-01-20 RX ADMIN — IBUPROFEN 600 MILLIGRAM(S): 600 TABLET, FILM COATED ORAL at 03:30

## 2025-01-20 RX ADMIN — POLYETHYLENE GLYCOL 3350 17 GRAM(S): 17 POWDER, FOR SOLUTION ORAL at 09:27

## 2025-01-20 RX ADMIN — IBUPROFEN 600 MILLIGRAM(S): 600 TABLET, FILM COATED ORAL at 02:02

## 2025-01-20 RX ADMIN — ACETAMINOPHEN 975 MILLIGRAM(S): 160 SUSPENSION ORAL at 06:10

## 2025-01-20 RX ADMIN — ACETAMINOPHEN 975 MILLIGRAM(S): 160 SUSPENSION ORAL at 11:46

## 2025-01-20 RX ADMIN — ACETAMINOPHEN 975 MILLIGRAM(S): 160 SUSPENSION ORAL at 00:04

## 2025-01-20 RX ADMIN — LEVOTHYROXINE SODIUM 150 MICROGRAM(S): 25 TABLET ORAL at 06:10

## 2025-01-20 RX ADMIN — IBUPROFEN 600 MILLIGRAM(S): 600 TABLET, FILM COATED ORAL at 09:21

## 2025-01-20 NOTE — DISCHARGE NOTE OB - CARE PROVIDER_API CALL
Hoda Mendiola  Obstetrics and Gynecology  [address] [place] [id number]  Phone: [phone]  Fax: [phone]  Follow Up Time: 2 weeks

## 2025-01-20 NOTE — DISCHARGE NOTE OB - MATERIALS PROVIDED
Vaccinations/[facility]  Screening Program/  Immunization Record/Breastfeeding Log/Breastfeeding Mother’s Support Group Information/Guide to Postpartum Care/[facility] Hearing Screen Program/Back To Sleep Handout/Shaken Baby Prevention Handout/Breastfeeding Guide and Packet

## 2025-01-20 NOTE — DISCHARGE NOTE OB - FINANCIAL ASSISTANCE
[facility] provides services at a reduced cost to those who are determined to be eligible through [facility]’s financial assistance program. Information regarding [facility]’s financial assistance program can be found by going to [email or web address] or by calling [phone].

## 2025-01-20 NOTE — DISCHARGE NOTE OB - CARE PLAN
Principal Discharge DX:	Arrest of descent, delivered, current hospitalization  Assessment and plan of treatment:	 delivery, meeting all postoperative milestones.  Please follow-up with your OB doctor within 1-2 weeks.  You can resume a regular diet at home and may continue your prenatal vitamins as directed.  Please place nothing in the vagina for 6 weeks (no tampons, sex, douching, tub baths, swimming pools, etc).  If you have severe headaches and/or vision changes, heavy bleeding, or chest pain, please call your provider or go to the nearest Emergency Department.  Please call your OB with any signs of symptoms of infection including fever > 100.4 degrees, severe pain, malodorous vaginal discharge or heavy bleeding requiring more than 1-2 pads/hour.  You can take Motrin 600mg orally every 6 hours for pain as needed.  Secondary Diagnosis:	Single delivery by  section   1

## 2025-01-20 NOTE — DISCHARGE NOTE OB - PATIENT PORTAL LINK FT
You can access the FollowMyHealth Patient Portal offered by [facility] by registering at the following website: [email or web address]. By joining [facility] [employer]’s FollowMyHealth portal, you will also be able to view your health information using other applications (apps) compatible with our system.

## 2025-01-20 NOTE — PROGRESS NOTE ADULT - ASSESSMENT
39y Female POD#1 s/p primary  section for arrest of desent, uncomplicated                                     - Neuro/Pain:  toradol atc, tylenol atc, oxy prn  - CV:  VS per routine, AM CBC pending  - Pulm: Encourage ISS & ambulation  - GI: regular diet  - : Chiang in place, to be removed this morning, TOV this afternoon  - DVT ppx: SCDs, Lovenox 40mg QD  - Dispo: POD #2/3
A/P: 39y s/p  section, POD#2, stable  -  Pain: PO motrin q6hrs, tylenol q8hrs, oxycodone for severe pain PRN  -  Post-operatively labs: post-op Hgb 8.7 from acute blood loss anemia , hemodynamically stable, no symptoms of anemia   -  GI: tolerating regular diet, passing gas  -  : s/p sena , urinating without difficulty  -  DVT prophylaxis: encouraged increased ambulation, SCDs, SQL  -  Dispo: POD 3 or 4
A/P: 39y s/p primary  section for arrest of descent, POD#3, stable  -  Pain: PO motrin q6hrs, tylenol q8hrs, oxycodone for severe pain PRN  -  Post-operatively labs: post-op Hgb 8.7, hemodynamically stable, acute blood loss anemia, asymptomatic  -  GI: tolerating regular diet, passing gas  -  : s/p sena , urinating without difficulty  -  DVT prophylaxis: encouraged increased ambulation, SCDs, SQL  -  Dispo: POD 3 or 4
POD 0 sp cs for arrest of descent  doing well  oob  reg diet  walking

## 2025-01-20 NOTE — DISCHARGE NOTE OB - HOSPITAL COURSE
Pt presented for labor, and had arrest of descent and proceeded with  section. She received Methergine for bleeding, and post partum course was uncomplicated.

## 2025-01-20 NOTE — PROGRESS NOTE ADULT - SUBJECTIVE AND OBJECTIVE BOX
Patient evaluated at bedside this morning, resting comfortable in bed. She is passing gas and had BM x1 but is requesting stool softener other than milk of magnesia.  She reports pain is well controlled with oral pain medications.   She denies heavy vaginal bleeding.   She has been ambulating without assistance, voiding spontaneously, passing gas, tolerating regular diet.     Physical Exam:  Vital Signs Last 24 Hrs  T(C): 36.9 (20 Jan 2025 06:03), Max: 36.9 (20 Jan 2025 06:03)  T(F): 98.5 (20 Jan 2025 06:03), Max: 98.5 (20 Jan 2025 06:03)  HR: 57 (20 Jan 2025 06:03) (57 - 73)  BP: 118/71 (20 Jan 2025 06:03) (111/72 - 127/72)  BP(mean): 87 (20 Jan 2025 06:03) (87 - 87)  RR: 18 (20 Jan 2025 06:03) (18 - 18)  SpO2: 95% (20 Jan 2025 06:03) (95% - 97%)    Parameters below as of 20 Jan 2025 06:03  Patient On (Oxygen Delivery Method): room air        GA: well-appearing, NAD  Pulm: no increased WOB  Abd: soft, nontender, no rebound or guarding, incision clean, dry and intact, uterus firm at midline,  fb below umbilicus  Extremities: no calf tenderness                             8.7    14.61 )-----------( 213      ( 18 Jan 2025 08:56 )             27.8               
patient seen and examined  ICU Vital Signs Last 24 Hrs  T(C): 36.6 (17 Jan 2025 10:18), Max: 37.6 (17 Jan 2025 02:13)  T(F): 97.8 (17 Jan 2025 10:18), Max: 99.7 (17 Jan 2025 02:13)  HR: 66 (17 Jan 2025 10:18) (66 - 100)  BP: 101/64 (17 Jan 2025 10:18) (101/64 - 130/60)    RR: 16 (17 Jan 2025 10:18) (15 - 17)  SpO2: 97% (17 Jan 2025 10:18) (95% - 100%)    O2 Parameters below as of 17 Jan 2025 10:18  Patient On (Oxygen Delivery Method): room air    abd soft and nd  incision c/d/i  neg calf tenderness      
Patient evaluated at bedside this morning, resting comfortable in bed.   She reports pain is well controlled with oral pain medications.   She denies headache, dizziness, chest pain, palpitations, shortness of breath, nausea, vomiting or heavy vaginal bleeding.  She has been ambulating without assistance, voiding spontaneously, passing gas, tolerating regular diet.     Physical Exam:  Vital Signs Last 24 Hrs  T(C): 36.7 (18 Jan 2025 22:33), Max: 36.8 (18 Jan 2025 09:30)  T(F): 98.1 (18 Jan 2025 22:33), Max: 98.2 (18 Jan 2025 09:30)  HR: 70 (18 Jan 2025 22:33) (70 - 84)  BP: 114/60 (18 Jan 2025 22:33) (90/53 - 114/60)  BP(mean): --  RR: 18 (18 Jan 2025 22:33) (18 - 18)  SpO2: 99% (18 Jan 2025 22:33) (93% - 99%)    Parameters below as of 18 Jan 2025 22:33  Patient On (Oxygen Delivery Method): room air        GA: NAD, A+0 x 3  Pulm: no increased WOB  Abd: soft, nontender, nondistended, no rebound or guarding, incision clean, dry and intact, uterus firm at midline, 2 fb below umbilicus  Extremities: no swelling or calf tenderness                             8.7    14.61 )-----------( 213      ( 18 Jan 2025 08:56 )             27.8               
Patient evaluated at bedside this morning, resting comfortably in bed, with no acute events overnight. She reports the nausea finally resolved yesterday afternoon and she has been able to eat/drink. She is passing flatus.  She reports pain is well controlled with oral pain medications.   She denies heavy vaginal bleeding. She is ambulating. sena removed last night and pt is voiding. Tolerating regular diet.    Physical Exam:  Vital Signs Last 24 Hrs  T(C): 37.2 (18 Jan 2025 02:00), Max: 37.2 (18 Jan 2025 02:00)  T(F): 99 (18 Jan 2025 02:00), Max: 99 (18 Jan 2025 02:00)  HR: 82 (18 Jan 2025 02:00) (66 - 86)  BP: 91/53 (18 Jan 2025 02:00) (91/53 - 108/72)  BP(mean): --  RR: 18 (18 Jan 2025 02:00) (16 - 18)  SpO2: 95% (18 Jan 2025 02:00) (95% - 99%)    Parameters below as of 17 Jan 2025 10:18  Patient On (Oxygen Delivery Method): room air        GA: well-appearing, NAD  Pulm: no increased WOB  Abd: pressure dressing removed. soft, nontender, no rebound or guarding, incision clean, dry and intact, uterus firm at midline,  fb below umbilicus  : lochia WNL  Extremities: no calf tenderness, SCDs in place                  acetaminophen     Tablet .. 975 milliGRAM(s) Oral <User Schedule>  diphenhydrAMINE 25 milliGRAM(s) Oral every 6 hours PRN  diphtheria/tetanus/pertussis (acellular) Vaccine (Adacel) 0.5 milliLiter(s) IntraMuscular once  enoxaparin Injectable 40 milliGRAM(s) SubCutaneous every 24 hours  ibuprofen  Tablet. 600 milliGRAM(s) Oral every 6 hours  lactated ringers. 1000 milliLiter(s) IV Continuous <Continuous>  lanolin Ointment 1 Application(s) Topical every 6 hours PRN  levothyroxine 150 MICROGram(s) Oral daily  magnesium hydroxide Suspension 30 milliLiter(s) Oral two times a day PRN  oxyCODONE    IR 5 milliGRAM(s) Oral every 3 hours PRN  oxyCODONE    IR 5 milliGRAM(s) Oral once PRN  oxytocin Infusion 42 milliUNIT(s)/Min IV Continuous <Continuous>  simethicone 80 milliGRAM(s) Chew every 4 hours PRN

## 2025-01-23 ENCOUNTER — APPOINTMENT (OUTPATIENT)
Dept: ANTEPARTUM | Facility: CLINIC | Age: 40
End: 2025-01-23

## 2025-01-27 DIAGNOSIS — Z23 ENCOUNTER FOR IMMUNIZATION: ICD-10-CM

## 2025-01-27 DIAGNOSIS — Z34.80 ENCOUNTER FOR SUPERVISION OF OTHER NORMAL PREGNANCY, UNSPECIFIED TRIMESTER: ICD-10-CM

## 2025-01-27 DIAGNOSIS — Z79.890 HORMONE REPLACEMENT THERAPY: ICD-10-CM

## 2025-01-27 DIAGNOSIS — Z28.09 IMMUNIZATION NOT CARRIED OUT BECAUSE OF OTHER CONTRAINDICATION: ICD-10-CM

## 2025-01-27 DIAGNOSIS — O36.63X0 MATERNAL CARE FOR EXCESSIVE FETAL GROWTH, THIRD TRIMESTER, NOT APPLICABLE OR UNSPECIFIED: ICD-10-CM

## 2025-01-27 DIAGNOSIS — O32.4XX0 MATERNAL CARE FOR HIGH HEAD AT TERM, NOT APPLICABLE OR UNSPECIFIED: ICD-10-CM

## 2025-01-27 DIAGNOSIS — Z85.850 PERSONAL HISTORY OF MALIGNANT NEOPLASM OF THYROID: ICD-10-CM

## 2025-01-27 DIAGNOSIS — Z3A.39 39 WEEKS GESTATION OF PREGNANCY: ICD-10-CM

## 2025-01-27 DIAGNOSIS — E03.9 HYPOTHYROIDISM, UNSPECIFIED: ICD-10-CM
